# Patient Record
Sex: FEMALE | Race: WHITE
[De-identification: names, ages, dates, MRNs, and addresses within clinical notes are randomized per-mention and may not be internally consistent; named-entity substitution may affect disease eponyms.]

---

## 2018-01-05 ENCOUNTER — HOSPITAL ENCOUNTER (OUTPATIENT)
Dept: HOSPITAL 8 - CFH | Age: 28
Discharge: HOME | End: 2018-01-05
Attending: PHYSICIAN ASSISTANT
Payer: MEDICAID

## 2018-01-05 DIAGNOSIS — R35.0: ICD-10-CM

## 2018-01-05 DIAGNOSIS — D73.4: Primary | ICD-10-CM

## 2018-01-05 PROCEDURE — 74178 CT ABD&PLV WO CNTR FLWD CNTR: CPT

## 2020-12-03 ENCOUNTER — HOSPITAL ENCOUNTER (OUTPATIENT)
Dept: LAB | Facility: MEDICAL CENTER | Age: 30
End: 2020-12-03
Attending: PHYSICIAN ASSISTANT
Payer: MEDICAID

## 2020-12-03 PROCEDURE — U0003 INFECTIOUS AGENT DETECTION BY NUCLEIC ACID (DNA OR RNA); SEVERE ACUTE RESPIRATORY SYNDROME CORONAVIRUS 2 (SARS-COV-2) (CORONAVIRUS DISEASE [COVID-19]), AMPLIFIED PROBE TECHNIQUE, MAKING USE OF HIGH THROUGHPUT TECHNOLOGIES AS DESCRIBED BY CMS-2020-01-R: HCPCS

## 2020-12-03 PROCEDURE — C9803 HOPD COVID-19 SPEC COLLECT: HCPCS

## 2020-12-04 LAB
COVID ORDER STATUS COVID19: NORMAL
SARS-COV-2 RNA RESP QL NAA+PROBE: DETECTED
SPECIMEN SOURCE: ABNORMAL

## 2021-09-05 NOTE — NUR
PT PRESENTS TO ER FOR RUNNY NOSE, COUGH, BODY PAINS, CONGESTION, SORE THROAT 
AND EAR SENSITIVITY FOR ABOUT 5 DAYS TOPS, PT STATES HER FATHER HAS COVID AND 
SHE SHARED A DRINK WITH HIM AND SINCE THEN SHE HAS BEEN HAVING THESE SYMPTOMS, 
PT STATES SHE MIGHT HAVE ALSO GOTTEN THESE SYMPTOMS FROM THE CASINO PLAYING ON 
THE SLOTS

## 2023-01-12 ENCOUNTER — TELEPHONE (OUTPATIENT)
Dept: CARDIOLOGY | Facility: MEDICAL CENTER | Age: 33
End: 2023-01-12

## 2023-01-12 NOTE — TELEPHONE ENCOUNTER
Called patient to try and reschedule missed appt. On 12/29/22 with RAGHAVENDRA Richardson. Unable to leave voicemail message due to number being out of service.

## 2023-03-13 ENCOUNTER — TELEPHONE (OUTPATIENT)
Dept: HEALTH INFORMATION MANAGEMENT | Facility: OTHER | Age: 33
End: 2023-03-13
Payer: MEDICAID

## 2024-06-12 ENCOUNTER — HOSPITAL ENCOUNTER (OUTPATIENT)
Dept: RADIOLOGY | Facility: MEDICAL CENTER | Age: 34
End: 2024-06-12
Attending: NURSE PRACTITIONER
Payer: MEDICAID

## 2024-06-12 DIAGNOSIS — R60.0 LEG EDEMA: ICD-10-CM

## 2024-06-12 PROCEDURE — 93971 EXTREMITY STUDY: CPT | Mod: LT

## 2024-08-22 ENCOUNTER — OFFICE VISIT (OUTPATIENT)
Dept: CARDIOLOGY | Facility: MEDICAL CENTER | Age: 34
End: 2024-08-22
Attending: INTERNAL MEDICINE
Payer: MEDICAID

## 2024-08-22 VITALS
HEIGHT: 65 IN | OXYGEN SATURATION: 96 % | BODY MASS INDEX: 43.65 KG/M2 | WEIGHT: 262 LBS | SYSTOLIC BLOOD PRESSURE: 128 MMHG | DIASTOLIC BLOOD PRESSURE: 62 MMHG | HEART RATE: 96 BPM | RESPIRATION RATE: 18 BRPM

## 2024-08-22 DIAGNOSIS — I83.813 VARICOSE VEINS OF BOTH LOWER EXTREMITIES WITH PAIN: ICD-10-CM

## 2024-08-22 DIAGNOSIS — R00.2 PALPITATIONS: ICD-10-CM

## 2024-08-22 LAB — EKG IMPRESSION: NORMAL

## 2024-08-22 PROCEDURE — 99212 OFFICE O/P EST SF 10 MIN: CPT | Performed by: INTERNAL MEDICINE

## 2024-08-22 PROCEDURE — 99211 OFF/OP EST MAY X REQ PHY/QHP: CPT | Performed by: INTERNAL MEDICINE

## 2024-08-22 PROCEDURE — 3078F DIAST BP <80 MM HG: CPT | Performed by: INTERNAL MEDICINE

## 2024-08-22 PROCEDURE — 93010 ELECTROCARDIOGRAM REPORT: CPT | Performed by: INTERNAL MEDICINE

## 2024-08-22 PROCEDURE — 99202 OFFICE O/P NEW SF 15 MIN: CPT | Performed by: INTERNAL MEDICINE

## 2024-08-22 PROCEDURE — 3074F SYST BP LT 130 MM HG: CPT | Performed by: INTERNAL MEDICINE

## 2024-08-22 PROCEDURE — 93005 ELECTROCARDIOGRAM TRACING: CPT | Performed by: INTERNAL MEDICINE

## 2024-08-22 PROCEDURE — 99204 OFFICE O/P NEW MOD 45 MIN: CPT | Mod: 25 | Performed by: INTERNAL MEDICINE

## 2024-08-22 RX ORDER — CLONIDINE HYDROCHLORIDE 0.1 MG/1
0.1 TABLET ORAL DAILY
COMMUNITY

## 2024-08-22 RX ORDER — IBUPROFEN 600 MG/1
600 TABLET, FILM COATED ORAL EVERY 8 HOURS PRN
COMMUNITY

## 2024-08-22 RX ORDER — LEVONORGESTREL AND ETHINYL ESTRADIOL 0.15-0.03
1 KIT ORAL DAILY
COMMUNITY

## 2024-08-22 RX ORDER — ASPIRIN 81 MG/1
81 TABLET ORAL DAILY
COMMUNITY

## 2024-08-22 RX ORDER — HYDROCORTISONE ACETATE 0.5 %
300 CREAM (GRAM) TOPICAL
Qty: 180 CAPSULE | Refills: 4 | Status: SHIPPED | OUTPATIENT
Start: 2024-08-22

## 2024-08-22 ASSESSMENT — ENCOUNTER SYMPTOMS
LOSS OF CONSCIOUSNESS: 0
SENSORY CHANGE: 0
VOMITING: 0
MYALGIAS: 0
SHORTNESS OF BREATH: 0
ORTHOPNEA: 0
BLURRED VISION: 0
DEPRESSION: 0
SPEECH CHANGE: 0
FEVER: 0
BRUISES/BLEEDS EASILY: 0
BLOOD IN STOOL: 0
DOUBLE VISION: 0
FALLS: 0
PALPITATIONS: 1
ABDOMINAL PAIN: 0
EYE PAIN: 0
NAUSEA: 0
COUGH: 0
EYE DISCHARGE: 0
DIZZINESS: 0
HALLUCINATIONS: 0
WEIGHT LOSS: 0
PND: 0
CLAUDICATION: 0
HEADACHES: 0
CHILLS: 0

## 2024-08-22 ASSESSMENT — FIBROSIS 4 INDEX: FIB4 SCORE: 0.68

## 2024-08-22 NOTE — PROGRESS NOTES
Chief Complaint   Patient presents with    New Patient     N/P Dx:PALPITATIONS        Subjective     Marika Martinez is a 34 y.o. female who presents today for cardiac care and evaluation of palpitations. Palpitations are sporadic. No specific worsening symptoms or precipitating symptoms. Patient feels like heart is being pulled down. No family history of sudden cardiac death. No presyncope or syncope associated with patient's palpitations.    Also here for vascular evaluation of persistent symptoms of lower extremities edema, pain in setting of possible venous insufficiency and reflux disease.  Patient has not tried compression stockings.  Patient is now seeking for invasive intervention for symptomatic relief and improving quality of life.  No prior vein treatments.  No prior vein stripping surgery.    I have personally interpreted EKG today with patient, there is no evidence of acute coronary syndrome, no evidence of prior infarct, normal OK and QT interval, no significant conduction disease. Sinus rhythm.    No family history of sudden cardiac death.        Past Medical History:   Diagnosis Date    Allergic conjunctivitis 10/9/2012    Bipolar 2 disorder (HCC)     Bipolar affective (HCC)     Bipolar disorder (HCC) 12/30/2010    Tobacco dependence 10/9/2012     Past Surgical History:   Procedure Laterality Date    NASAL FRACTURE REDUCTION OPEN  4/6/2011    Performed by JERO JAIME at SURGERY [facility] ORS    DENTAL EXTRACTION(S)  2006    TONSILLECTOMY  2002     Family History   Problem Relation Age of Onset    Hypertension Father     Psychiatric Illness Sister     Diabetes Maternal Grandmother     Diabetes Paternal Grandmother     Stroke Neg Hx     Heart Disease Neg Hx     Cancer Neg Hx      Social History     Socioeconomic History    Marital status: Single     Spouse name: Not on file    Number of children: Not on file    Years of education: Not on file    Highest education level: Not on file  "  Occupational History    Not on file   Tobacco Use    Smoking status: Former     Types: Cigarettes    Smokeless tobacco: Never    Tobacco comments:     1 pack every 2-3 weeks   Substance and Sexual Activity    Alcohol use: No    Drug use: No    Sexual activity: Not Currently     Partners: Male     Birth control/protection: Condom   Other Topics Concern    Not on file   Social History Narrative    ** Merged History Encounter **          Social Determinants of Health     Financial Resource Strain: Not on file   Food Insecurity: Not on file   Transportation Needs: Not on file   Physical Activity: Not on file   Stress: Not on file   Social Connections: Not on file   Intimate Partner Violence: Not on file   Housing Stability: Not on file     Allergies   Allergen Reactions    Nkda [No Known Drug Allergy]     Other Drug      \"green pill for bipolar\" causes visual hallucinations     Outpatient Encounter Medications as of 8/22/2024   Medication Sig Dispense Refill    cloNIDine (CATAPRES) 0.1 MG Tab Take 0.1 mg by mouth every day.      MARLISSA 0.15-30 MG-MCG per tablet Take 1 Tablet by mouth every day.      ibuprofen (MOTRIN) 600 MG Tab Take 600 mg by mouth every 8 hours as needed for Moderate Pain.      aspirin 81 MG EC tablet Take 81 mg by mouth every day.      Horse [place] 300 MG Cap Take 1 Capsule by mouth 2 times a day. 180 Capsule 4    trazodone (DESYREL) 100 MG TABS Take 50 mg by mouth every evening. 50 mg more if needed      divalproex EC (DEPAKOTE) 500 MG TBEC Take 500 mg by mouth 4 times a day.      hydrOXYzine (ATARAX) 25 MG TABS Take 10 mg by mouth 3 times a day as needed.      [DISCONTINUED] paliperidone (INVEGA) 3 MG ER tablet Take 3 mg by mouth every morning. (Patient not taking: Reported on 8/22/2024)      [DISCONTINUED] clonazepam (KLONOPIN) 0.5 MG TABS Take 0.5 mg by mouth 2 times a day. (Patient not taking: Reported on 8/22/2024)       No facility-administered encounter medications on file as of " "8/22/2024.     Review of Systems   Constitutional:  Negative for chills, fever, malaise/fatigue and weight loss.   HENT:  Negative for ear discharge, ear pain, hearing loss and nosebleeds.    Eyes:  Negative for blurred vision, double vision, pain and discharge.   Respiratory:  Negative for cough and shortness of breath.    Cardiovascular:  Positive for palpitations and leg swelling. Negative for chest pain, orthopnea, claudication and PND.   Gastrointestinal:  Negative for abdominal pain, blood in stool, melena, nausea and vomiting.   Genitourinary:  Negative for dysuria and hematuria.   Musculoskeletal:  Negative for falls, joint pain and myalgias.   Skin:  Negative for itching and rash.   Neurological:  Negative for dizziness, sensory change, speech change, loss of consciousness and headaches.   Endo/Heme/Allergies:  Negative for environmental allergies. Does not bruise/bleed easily.   Psychiatric/Behavioral:  Negative for depression, hallucinations and suicidal ideas.               Objective     /62 (BP Location: Left arm, Patient Position: Sitting, BP Cuff Size: Adult)   Pulse 96   Resp 18   Ht 1.651 m (5' 5\")   Wt 119 kg (262 lb)   SpO2 96%   BMI 43.60 kg/m²     Physical Exam  Vitals and nursing note reviewed.   Constitutional:       General: She is not in acute distress.     Appearance: She is not diaphoretic.   HENT:      Head: Normocephalic and atraumatic.      Right Ear: External ear normal.      Left Ear: External ear normal.      Nose: No congestion or rhinorrhea.   Eyes:      General:         Right eye: No discharge.         Left eye: No discharge.   Neck:      Thyroid: No thyromegaly.      Vascular: No JVD.   Cardiovascular:      Rate and Rhythm: Normal rate and regular rhythm.      Pulses: Normal pulses.   Pulmonary:      Effort: No respiratory distress.   Abdominal:      General: There is no distension.      Tenderness: There is no abdominal tenderness.   Musculoskeletal:         General: " No swelling or tenderness.      Right lower leg: No edema.      Left lower leg: No edema.      Comments: + varicose veins without evidence of ulcer, + discoloration.     Skin:     General: Skin is warm and dry.   Neurological:      Mental Status: She is alert and oriented to person, place, and time.      Cranial Nerves: No cranial nerve deficit.   Psychiatric:         Behavior: Behavior normal.                Assessment & Plan     1. Palpitations  EKG    Cardiac Event Monitor    EC-ECHOCARDIOGRAM COMPLETE W/O CONT    NM-CARDIAC TREADMILL ONLY-NO IMAGING      2. Varicose veins of both lower extremities with pain  US-EXTREMITY VENOUS LOWER BILAT    Elastic Support Stockings    Horse [place] 300 MG Cap          Medical Decision Making: Today's Assessment/Status/Plan:   At this time, optimize use of compression stockings with at least grading of 20 to 30 mmHg, at least knee-high.  Thigh-high length is ideal.  Trial of horse chestnut 300 mg twice a day for veno-dilatory effect.    At this time, to further risk stratify, I will order a transthoracic echocardiogram to assess cardiac and valvular functions. I will also order a treadmill stress test (to avoid radiation exposure in young patients) to assess for coronary ischemia.    This visit encounter signifies the visit complexity inherent to evaluation and management associated with medical care services that serve as the continuing focal point for all needed health care services and/or with medical care services that are part of ongoing care related to this patient's single, serious condition, complex cardiac condition.    Danielle French M.D.

## 2024-08-23 ENCOUNTER — TELEPHONE (OUTPATIENT)
Dept: CARDIOLOGY | Facility: MEDICAL CENTER | Age: 34
End: 2024-08-23
Payer: MEDICAID

## 2024-08-23 NOTE — TELEPHONE ENCOUNTER
TT  Caller: Marika Martinez    Topic/issue: Patient was told that she could speak with TT to change the priority to urgent for her US-EXTREMITY VENOUS LOWER BILAT since she will be moving in October. Patient was told there is nothing available for routine priority until November and patient would really like to complete everything before she moves. Please call patient back to verify that this has been done.    Patient also wanted to let imaging scheduling know that she needs an afternoon appointment.    Callback Number: [phone]    Thank you,  Roseline ARTHUR

## 2024-08-23 NOTE — TELEPHONE ENCOUNTER
TT: Please advise if okay to change order to urgent for US-EXTREMITY VENOUS LOWER BILAT     Patient moving out of town in October

## 2024-08-23 NOTE — TELEPHONE ENCOUNTER
Phone Number Called: [phone]  20 minute phone conversation  Call outcome: Spoke to patient regarding message below.    Message: Discussed how to get on cancellation list and get mychart set up. All of patient's questions were answered.  No further questions or concerns

## 2024-09-04 ENCOUNTER — NON-PROVIDER VISIT (OUTPATIENT)
Dept: CARDIOLOGY | Facility: MEDICAL CENTER | Age: 34
End: 2024-09-04
Attending: INTERNAL MEDICINE
Payer: MEDICAID

## 2024-09-04 DIAGNOSIS — R00.2 PALPITATIONS: ICD-10-CM

## 2024-09-04 PROCEDURE — 93246 EXT ECG>7D<15D RECORDING: CPT

## 2024-09-04 NOTE — PROGRESS NOTES
Patient enrolled in the 14 day Zio Holter monitor per Mau French MD. In clinic [facility], monitor s/n [id number]. Pending EOS.   home

## 2024-09-05 ENCOUNTER — TELEPHONE (OUTPATIENT)
Dept: CARDIOLOGY | Facility: MEDICAL CENTER | Age: 34
End: 2024-09-05
Payer: MEDICAID

## 2024-09-17 ENCOUNTER — ANCILLARY PROCEDURE (OUTPATIENT)
Dept: CARDIOLOGY | Facility: MEDICAL CENTER | Age: 34
End: 2024-09-17
Attending: INTERNAL MEDICINE
Payer: MEDICAID

## 2024-09-17 DIAGNOSIS — R00.2 PALPITATIONS: ICD-10-CM

## 2024-09-17 LAB
LV EJECT FRACT  99904: 65
LV EJECT FRACT MOD 2C 99903: 47.53
LV EJECT FRACT MOD 4C 99902: 62.23
LV EJECT FRACT MOD BP 99901: 52.93

## 2024-09-17 PROCEDURE — 93306 TTE W/DOPPLER COMPLETE: CPT | Mod: 26 | Performed by: INTERNAL MEDICINE

## 2024-09-17 PROCEDURE — 93306 TTE W/DOPPLER COMPLETE: CPT

## 2024-09-20 ENCOUNTER — HOSPITAL ENCOUNTER (OUTPATIENT)
Dept: RADIOLOGY | Facility: MEDICAL CENTER | Age: 34
End: 2024-09-20
Attending: INTERNAL MEDICINE
Payer: MEDICAID

## 2024-09-20 DIAGNOSIS — R00.2 PALPITATIONS: ICD-10-CM

## 2024-09-20 PROCEDURE — 93018 CV STRESS TEST I&R ONLY: CPT | Performed by: INTERNAL MEDICINE

## 2024-09-20 PROCEDURE — 93017 CV STRESS TEST TRACING ONLY: CPT

## 2024-10-04 ENCOUNTER — TELEPHONE (OUTPATIENT)
Dept: CARDIOLOGY | Facility: MEDICAL CENTER | Age: 34
End: 2024-10-04
Payer: MEDICAID